# Patient Record
Sex: MALE | Race: WHITE | NOT HISPANIC OR LATINO | Employment: FULL TIME | URBAN - METROPOLITAN AREA
[De-identification: names, ages, dates, MRNs, and addresses within clinical notes are randomized per-mention and may not be internally consistent; named-entity substitution may affect disease eponyms.]

---

## 2021-05-10 ENCOUNTER — PATIENT MESSAGE (OUTPATIENT)
Dept: RESEARCH | Facility: HOSPITAL | Age: 56
End: 2021-05-10

## 2021-07-23 PROBLEM — U07.1 PNEUMONIA DUE TO COVID-19 VIRUS: Status: ACTIVE | Noted: 2021-07-23

## 2021-07-23 PROBLEM — I10 ESSENTIAL HYPERTENSION: Status: ACTIVE | Noted: 2021-07-23

## 2021-07-23 PROBLEM — J96.01 ACUTE HYPOXEMIC RESPIRATORY FAILURE: Status: ACTIVE | Noted: 2021-07-23

## 2021-07-23 PROBLEM — J12.82 PNEUMONIA DUE TO COVID-19 VIRUS: Status: ACTIVE | Noted: 2021-07-23

## 2021-07-27 PROBLEM — D68.9 COAGULATION DEFECT: Status: ACTIVE | Noted: 2021-07-27

## 2021-10-25 ENCOUNTER — CLINICAL SUPPORT (OUTPATIENT)
Dept: URGENT CARE | Facility: CLINIC | Age: 56
End: 2021-10-25
Payer: COMMERCIAL

## 2021-10-25 DIAGNOSIS — Z23 NEED FOR VACCINATION: Primary | ICD-10-CM

## 2021-10-25 PROCEDURE — 91303 COVID-19,VECTOR-NR,RS-AD26,PF,0.5 ML DOSE VACCINE (JANSSEN): CPT | Mod: S$GLB,,, | Performed by: FAMILY MEDICINE

## 2021-10-25 PROCEDURE — 0031A COVID-19,VECTOR-NR,RS-AD26,PF,0.5 ML DOSE VACCINE (JANSSEN): CPT | Mod: S$GLB,,, | Performed by: FAMILY MEDICINE

## 2021-10-25 PROCEDURE — 91303 COVID-19,VECTOR-NR,RS-AD26,PF,0.5 ML DOSE VACCINE (JANSSEN): ICD-10-PCS | Mod: S$GLB,,, | Performed by: FAMILY MEDICINE

## 2021-10-25 PROCEDURE — 0031A COVID-19,VECTOR-NR,RS-AD26,PF,0.5 ML DOSE VACCINE (JANSSEN): ICD-10-PCS | Mod: S$GLB,,, | Performed by: FAMILY MEDICINE

## 2022-01-10 ENCOUNTER — OFFICE VISIT (OUTPATIENT)
Dept: CARDIOLOGY | Facility: CLINIC | Age: 57
End: 2022-01-10
Payer: COMMERCIAL

## 2022-01-10 VITALS
SYSTOLIC BLOOD PRESSURE: 128 MMHG | WEIGHT: 315 LBS | BODY MASS INDEX: 39.17 KG/M2 | HEART RATE: 79 BPM | HEIGHT: 75 IN | DIASTOLIC BLOOD PRESSURE: 89 MMHG

## 2022-01-10 DIAGNOSIS — R06.02 SHORTNESS OF BREATH: ICD-10-CM

## 2022-01-10 DIAGNOSIS — R00.0 TACHYCARDIA: ICD-10-CM

## 2022-01-10 DIAGNOSIS — I83.93 VARICOSE VEINS OF BOTH LOWER EXTREMITIES, UNSPECIFIED WHETHER COMPLICATED: Primary | ICD-10-CM

## 2022-01-10 DIAGNOSIS — I10 ESSENTIAL HYPERTENSION: Primary | ICD-10-CM

## 2022-01-10 DIAGNOSIS — I10 ESSENTIAL HYPERTENSION: ICD-10-CM

## 2022-01-10 PROCEDURE — 3008F PR BODY MASS INDEX (BMI) DOCUMENTED: ICD-10-PCS | Mod: CPTII,S$GLB,, | Performed by: INTERNAL MEDICINE

## 2022-01-10 PROCEDURE — 99204 OFFICE O/P NEW MOD 45 MIN: CPT | Mod: S$GLB,,, | Performed by: INTERNAL MEDICINE

## 2022-01-10 PROCEDURE — 1159F PR MEDICATION LIST DOCUMENTED IN MEDICAL RECORD: ICD-10-PCS | Mod: CPTII,S$GLB,, | Performed by: INTERNAL MEDICINE

## 2022-01-10 PROCEDURE — 3079F DIAST BP 80-89 MM HG: CPT | Mod: CPTII,S$GLB,, | Performed by: INTERNAL MEDICINE

## 2022-01-10 PROCEDURE — 3008F BODY MASS INDEX DOCD: CPT | Mod: CPTII,S$GLB,, | Performed by: INTERNAL MEDICINE

## 2022-01-10 PROCEDURE — 1160F RVW MEDS BY RX/DR IN RCRD: CPT | Mod: CPTII,S$GLB,, | Performed by: INTERNAL MEDICINE

## 2022-01-10 PROCEDURE — 1159F MED LIST DOCD IN RCRD: CPT | Mod: CPTII,S$GLB,, | Performed by: INTERNAL MEDICINE

## 2022-01-10 PROCEDURE — 99999 PR PBB SHADOW E&M-EST. PATIENT-LVL III: ICD-10-PCS | Mod: PBBFAC,,, | Performed by: INTERNAL MEDICINE

## 2022-01-10 PROCEDURE — 93005 ELECTROCARDIOGRAM TRACING: CPT | Mod: PO

## 2022-01-10 PROCEDURE — 3074F SYST BP LT 130 MM HG: CPT | Mod: CPTII,S$GLB,, | Performed by: INTERNAL MEDICINE

## 2022-01-10 PROCEDURE — 99204 PR OFFICE/OUTPT VISIT, NEW, LEVL IV, 45-59 MIN: ICD-10-PCS | Mod: S$GLB,,, | Performed by: INTERNAL MEDICINE

## 2022-01-10 PROCEDURE — 93010 EKG 12-LEAD: ICD-10-PCS | Mod: S$GLB,,, | Performed by: INTERNAL MEDICINE

## 2022-01-10 PROCEDURE — 3079F PR MOST RECENT DIASTOLIC BLOOD PRESSURE 80-89 MM HG: ICD-10-PCS | Mod: CPTII,S$GLB,, | Performed by: INTERNAL MEDICINE

## 2022-01-10 PROCEDURE — 1160F PR REVIEW ALL MEDS BY PRESCRIBER/CLIN PHARMACIST DOCUMENTED: ICD-10-PCS | Mod: CPTII,S$GLB,, | Performed by: INTERNAL MEDICINE

## 2022-01-10 PROCEDURE — 93010 ELECTROCARDIOGRAM REPORT: CPT | Mod: S$GLB,,, | Performed by: INTERNAL MEDICINE

## 2022-01-10 PROCEDURE — 3074F PR MOST RECENT SYSTOLIC BLOOD PRESSURE < 130 MM HG: ICD-10-PCS | Mod: CPTII,S$GLB,, | Performed by: INTERNAL MEDICINE

## 2022-01-10 PROCEDURE — 99999 PR PBB SHADOW E&M-EST. PATIENT-LVL III: CPT | Mod: PBBFAC,,, | Performed by: INTERNAL MEDICINE

## 2022-01-10 RX ORDER — NAPROXEN SODIUM 220 MG/1
81 TABLET, FILM COATED ORAL DAILY
COMMUNITY
End: 2022-08-12

## 2022-01-10 RX ORDER — LISINOPRIL 10 MG/1
10 TABLET ORAL DAILY
Qty: 90 TABLET | Refills: 3 | Status: SHIPPED | OUTPATIENT
Start: 2022-01-10 | End: 2023-03-25 | Stop reason: SDUPTHER

## 2022-01-10 NOTE — PROGRESS NOTES
Subjective:    Patient ID:  Sreedhar Garrett is a 56 y.o. male who presents for evaluation of No chief complaint on file.      Problem List Items Addressed This Visit        Cardiac/Vascular    BLE Varicose Veins With H/O RLE Venous Stripping - Primary    Essential hypertension      Other Visit Diagnoses     Shortness of breath        Tachycardia              HPI  Presents today for evaluation.    The patient states that since he had COVID, had some issues with tachycardia. Has improved now significantly. HR at home is in the 50s to 70s. Pulse Ox OK at home as well.    No chest pain.  Still lingering shortness of breath.  Feels OK    2 cups of coffee a day    Cramped a lot from HCTZ, not taking    Personal history of heart attack or stroke - None that he is aware of  Family history of heart disease - Dad with multiple heart attacks in the past, started in late 50s to 60s    Past Medical History:   Diagnosis Date    ADHD (attention deficit hyperactivity disorder)     e Mildly Elevated TSH Level With Normal FT4 17     Will Monitor    f Morbid Obesity     2/15/17 RXd Lifestyle Changes; He NEVER WANTS Bariatric SX; 17 Cortisol Level = Normal    Hypertension     i Bilateral Viral Pneumonia 2021    [facility] 21-21 Stay For This    i COVID-19 Infection 2021    [facility] 21-21 Stay For This    j Family H/O Pancreatic Cancer     His Mother Survived This And  Many Years Later With Dementia    j Hemorrhoids With H/O Hemorrhoidectomy     j Mildly Elevated ALT Level 17     Will Monitor    k Decreased Libido     17 Testosterone = 183.0 (132.0-813.0)    k Family H/O Prostate Cancer     l H/O Right Knee Arthroscopic SX In      m Family H/O Dementia     His  Mother    o Allergic Rhinosinusitis     q BLE Varicose Veins With H/O RLE Venous Stripping     With Dr. Julian At [facility] In     Wellness Visit 2/15/2017        No past surgical history on  "file.    Family History   Problem Relation Age of Onset    Cancer Mother     Heart disease Father        Social History     Socioeconomic History    Marital status:    Tobacco Use    Smoking status: Never Smoker    Smokeless tobacco: Never Used   Substance and Sexual Activity    Alcohol use: Yes     Alcohol/week: 6.0 standard drinks     Types: 6 Cans of beer per week     Comment: 2-3 drinks / 2-3x week    Drug use: Never    Sexual activity: Yes       Review of patient's allergies indicates:  No Known Allergies    Review of Systems   Constitutional: Negative for decreased appetite, fever and malaise/fatigue.   Eyes: Negative for blurred vision.   Cardiovascular: Negative for chest pain, dyspnea on exertion, irregular heartbeat and leg swelling.   Respiratory: Negative for cough, hemoptysis, shortness of breath and wheezing.    Endocrine: Negative for cold intolerance and heat intolerance.   Hematologic/Lymphatic: Negative for bleeding problem.   Musculoskeletal: Negative for muscle weakness and myalgias.   Gastrointestinal: Negative for abdominal pain, constipation and diarrhea.   Genitourinary: Negative for bladder incontinence.   Neurological: Negative for dizziness and weakness.   Psychiatric/Behavioral: Negative for depression.        Objective:     Vitals:    01/10/22 1302   BP: 128/89   BP Location: Right arm   Patient Position: Sitting   BP Method: Large (Automatic)   Pulse: 79   Weight: (!) 161.9 kg (356 lb 14.8 oz)   Height: 6' 3" (1.905 m)        Physical Exam  Constitutional:       Appearance: He is well-developed and well-nourished.   HENT:      Head: Normocephalic and atraumatic.   Neck:      Vascular: No JVD.   Cardiovascular:      Rate and Rhythm: Normal rate and regular rhythm.      Heart sounds: Normal heart sounds. No murmur heard.  No friction rub. No gallop.    Pulmonary:      Effort: Pulmonary effort is normal. No respiratory distress.      Breath sounds: Normal breath sounds. No " wheezing or rales.   Abdominal:      General: Bowel sounds are normal.      Palpations: Abdomen is soft.      Tenderness: There is no abdominal tenderness. There is no guarding or rebound.   Musculoskeletal:         General: No edema.      Cervical back: Normal range of motion and neck supple.   Skin:     General: Skin is warm and dry.   Neurological:      Mental Status: He is alert and oriented to person, place, and time.   Psychiatric:         Behavior: Behavior normal.             Current Outpatient Medications on File Prior to Visit   Medication Sig    ascorbic acid-multivit-min (EMERGEN-C) 1,000 mg PwEP     aspirin 81 MG Chew Take 81 mg by mouth once daily.    dextroamphetamine-amphetamine 30 mg Tab Take 1 tablet by mouth 2 (two) times daily.    lisinopriL 10 MG tablet Take 1 mg by mouth once daily.    meloxicam (MOBIC) 7.5 MG tablet Take 1 tablet (7.5 mg total) by mouth once daily.    multivitamin capsule Take 1 capsule by mouth once daily.    vitamin D (VITAMIN D3) 1000 units Tab Take 1,000 Units by mouth once daily. Take 2    traZODone (DESYREL) 50 MG tablet Take 0.5 tablets (25 mg total) by mouth nightly as needed for Insomnia.    [DISCONTINUED] hydroCHLOROthiazide (HYDRODIURIL) 12.5 MG Tab Take 1 tablet (12.5 mg total) by mouth once daily.     No current facility-administered medications on file prior to visit.       Lipid Panel:   Lab Results   Component Value Date    CHOL 172 04/26/2017    HDL 43 04/26/2017    LDLCALC 115.0 04/26/2017    TRIG 70 04/26/2017    CHOLHDL 25.0 04/26/2017         The ASCVD Risk score (Katrina OSMIN Jr., et al., 2013) failed to calculate for the following reasons:    Cannot find a previous HDL lab    Cannot find a previous total cholesterol lab    All pertinent labs, imaging, and EKGs reviewed.  Patient's most recent EKG tracing was personally interpreted by this provider.    Assessment:       1. Varicose veins of both lower extremities, unspecified whether complicated    2.  Essential hypertension    3. Shortness of breath    4. Tachycardia         Plan:     Symptoms of lingering shortness of breath possibly from COVID  BP/Pulse okay today  Most recent echocardiogram reviewed personally     Echocardiogram   Lipid panel was done with blood donation, will send us the numbers  Omega 3s    Continue other cardiac medications  Mediterranean Diet/Cardiovascular Exercise Program    Patient queried and all questions were answered.    F/u in 1 year to reassess      Signed:    Stephen Sin MD  1/10/2022 12:11 PM

## 2022-01-25 DIAGNOSIS — E78.2 MIXED HYPERLIPIDEMIA: Primary | ICD-10-CM

## 2022-08-29 PROBLEM — D68.9 COAGULATION DEFECT: Status: RESOLVED | Noted: 2021-07-27 | Resolved: 2022-08-29

## 2022-08-29 PROBLEM — I10 ESSENTIAL HYPERTENSION: Status: RESOLVED | Noted: 2021-07-23 | Resolved: 2022-08-29

## 2022-08-29 PROBLEM — Z86.16 HISTORY OF 2019 NOVEL CORONAVIRUS DISEASE (COVID-19): Status: ACTIVE | Noted: 2022-08-29

## 2022-08-29 PROBLEM — F90.9 ADHD (ATTENTION DEFICIT HYPERACTIVITY DISORDER): Status: ACTIVE | Noted: 2022-08-29

## 2022-08-29 PROBLEM — I10 ESSENTIAL (PRIMARY) HYPERTENSION: Status: ACTIVE | Noted: 2022-08-29

## 2022-08-29 PROBLEM — Z87.01 HISTORY OF VIRAL PNEUMONIA: Status: ACTIVE | Noted: 2022-08-29

## 2022-11-25 PROBLEM — Z87.01 HISTORY OF VIRAL PNEUMONIA: Status: RESOLVED | Noted: 2022-08-29 | Resolved: 2022-11-25

## 2022-11-25 PROBLEM — J96.01 ACUTE HYPOXEMIC RESPIRATORY FAILURE: Status: RESOLVED | Noted: 2021-07-23 | Resolved: 2022-11-25

## 2022-11-25 PROBLEM — J12.82 PNEUMONIA DUE TO COVID-19 VIRUS: Status: RESOLVED | Noted: 2021-07-23 | Resolved: 2022-11-25

## 2022-11-25 PROBLEM — U07.1 PNEUMONIA DUE TO COVID-19 VIRUS: Status: RESOLVED | Noted: 2021-07-23 | Resolved: 2022-11-25

## 2023-02-18 PROBLEM — Z86.16 HISTORY OF 2019 NOVEL CORONAVIRUS DISEASE (COVID-19): Status: RESOLVED | Noted: 2022-08-29 | Resolved: 2023-02-18

## 2023-02-18 PROBLEM — J30.9 ALLERGIC SINUSITIS: Status: ACTIVE | Noted: 2023-02-18

## 2023-02-18 PROBLEM — Z87.01 HISTORY OF VIRAL PNEUMONIA: Status: ACTIVE | Noted: 2023-02-18

## 2023-02-18 PROBLEM — Z86.16 HISTORY OF 2019 NOVEL CORONAVIRUS DISEASE (COVID-19): Status: RESOLVED | Noted: 2023-02-18 | Resolved: 2023-02-18

## 2023-02-18 PROBLEM — Z86.16 HISTORY OF 2019 NOVEL CORONAVIRUS DISEASE (COVID-19): Status: ACTIVE | Noted: 2023-02-18

## 2023-03-25 RX ORDER — LISINOPRIL 10 MG/1
10 TABLET ORAL DAILY
Qty: 90 TABLET | Refills: 3 | Status: SHIPPED | OUTPATIENT
Start: 2023-03-25 | End: 2023-03-29 | Stop reason: SDUPTHER

## 2023-03-29 DIAGNOSIS — Z81.8 FAMILY HISTORY OF DEMENTIA: ICD-10-CM

## 2023-03-29 DIAGNOSIS — I10 ESSENTIAL (PRIMARY) HYPERTENSION: Primary | ICD-10-CM

## 2023-03-29 RX ORDER — LISINOPRIL 10 MG/1
10 TABLET ORAL DAILY
Qty: 90 TABLET | Refills: 0 | Status: SHIPPED | OUTPATIENT
Start: 2023-03-29 | End: 2024-03-28

## 2023-09-21 PROBLEM — G47.9 SLEEP DISTURBANCES: Status: ACTIVE | Noted: 2023-09-21

## 2023-09-21 PROBLEM — R45.4 IRRITABILITY AND ANGER: Status: ACTIVE | Noted: 2023-09-21

## 2024-04-07 DIAGNOSIS — I10 ESSENTIAL (PRIMARY) HYPERTENSION: ICD-10-CM

## 2024-04-07 DIAGNOSIS — Z81.8 FAMILY HISTORY OF DEMENTIA: ICD-10-CM

## 2024-04-07 RX ORDER — LISINOPRIL 10 MG/1
10 TABLET ORAL DAILY
Qty: 90 TABLET | Refills: 3 | Status: SHIPPED | OUTPATIENT
Start: 2024-04-07 | End: 2025-04-07

## 2025-06-10 PROBLEM — M19.012 ARTHRITIS OF LEFT ACROMIOCLAVICULAR JOINT: Status: ACTIVE | Noted: 2025-06-10

## 2025-06-10 PROBLEM — M75.122 NONTRAUMATIC COMPLETE TEAR OF LEFT ROTATOR CUFF: Status: ACTIVE | Noted: 2025-06-10

## 2025-06-10 PROBLEM — M75.22 TENDINITIS OF LONG HEAD OF BICEPS BRACHII OF LEFT SHOULDER: Status: ACTIVE | Noted: 2025-06-10

## 2025-06-10 PROBLEM — S46.812D PARTIAL TEAR OF SUBSCAPULARIS TENDON, LEFT, SUBSEQUENT ENCOUNTER: Status: ACTIVE | Noted: 2025-06-10

## 2025-06-13 ENCOUNTER — PATIENT MESSAGE (OUTPATIENT)
Dept: CARDIOLOGY | Facility: CLINIC | Age: 60
End: 2025-06-13

## 2025-06-30 ENCOUNTER — OFFICE VISIT (OUTPATIENT)
Dept: CARDIOLOGY | Facility: CLINIC | Age: 60
End: 2025-06-30
Payer: COMMERCIAL

## 2025-06-30 VITALS
HEART RATE: 78 BPM | DIASTOLIC BLOOD PRESSURE: 76 MMHG | SYSTOLIC BLOOD PRESSURE: 115 MMHG | WEIGHT: 292 LBS | BODY MASS INDEX: 38.7 KG/M2 | HEIGHT: 73 IN

## 2025-06-30 DIAGNOSIS — I83.93 VARICOSE VEINS OF BOTH LOWER EXTREMITIES, UNSPECIFIED WHETHER COMPLICATED: ICD-10-CM

## 2025-06-30 DIAGNOSIS — I10 ESSENTIAL (PRIMARY) HYPERTENSION: ICD-10-CM

## 2025-06-30 DIAGNOSIS — Z87.01 HISTORY OF VIRAL PNEUMONIA: ICD-10-CM

## 2025-06-30 DIAGNOSIS — Z01.818 PREOPERATIVE CLEARANCE: Primary | ICD-10-CM

## 2025-06-30 PROCEDURE — 3044F HG A1C LEVEL LT 7.0%: CPT | Mod: CPTII,S$GLB,,

## 2025-06-30 PROCEDURE — 99204 OFFICE O/P NEW MOD 45 MIN: CPT | Mod: S$GLB,,,

## 2025-06-30 PROCEDURE — 4010F ACE/ARB THERAPY RXD/TAKEN: CPT | Mod: CPTII,S$GLB,,

## 2025-06-30 PROCEDURE — 3074F SYST BP LT 130 MM HG: CPT | Mod: CPTII,S$GLB,,

## 2025-06-30 PROCEDURE — 3008F BODY MASS INDEX DOCD: CPT | Mod: CPTII,S$GLB,,

## 2025-06-30 PROCEDURE — 1159F MED LIST DOCD IN RCRD: CPT | Mod: CPTII,S$GLB,,

## 2025-06-30 PROCEDURE — 3078F DIAST BP <80 MM HG: CPT | Mod: CPTII,S$GLB,,

## 2025-06-30 PROCEDURE — 99999 PR PBB SHADOW E&M-EST. PATIENT-LVL III: CPT | Mod: PBBFAC,,,

## 2025-06-30 NOTE — PROGRESS NOTES
Subjective & HPI:    Patient ID:  Sreedhar Garrett is a 60 y.o. male patient here for evaluation No chief complaint on file.    Previously seen by Dr. Sin in 2022 here today for a preoperative clearance for a shoulder surgery, injured while weight lifting. Works as an ICU nurse. Having no CV concerns. BP well controlled.      Past Medical History:   Diagnosis Date    b Hypertension     e Mildly Elevated TSH Level With Normal FT4 17     Will Monitor    f Morbid Obesity     2/15/17 RXd Lifestyle Changes; He NEVER WANTS Bariatric SX; 17 Cortisol Level = Normal    i H/O COVID-19 Infection With Pneumonia     [facility] 21-21 Stay For This    j Family H/O Pancreatic Cancer     His Mother Survived This And  Many Years Later With Dementia    j Hemorrhoids With H/O Hemorrhoidectomy     j Mildly Elevated ALT Level 17     Will Monitor    k Decreased Libido     17 Testosterone = 183.0 (132.0-813.0)    k Family H/O Prostate Cancer     l H/O Right Knee Arthroscopic SX In      m Family H/O Dementia     His  Mother    n Attention Deficit hyperactivity Disorder     o Allergic Rhinosinusitis     q BLE Varicose Veins With H/O RLE Venous Stripping     With [facility]. [facility] In     Wellness Visit 2/15/2017      Past Surgical History:   Procedure Laterality Date    HEMORRHOID SURGERY          KNEE ARTHROSCOPY W/ MENISCAL REPAIR Bilateral      Social History[1]    Most Recent Echocardiogram Results  Results for orders placed during the hospital encounter of 22    Echo    Interpretation Summary  · The left ventricle is normal in size with concentric remodeling and normal systolic function.  · The estimated ejection fraction is 65%.  · Normal left ventricular diastolic function.  · Normal right ventricular size with normal right ventricular systolic function.  · Normal central venous pressure (3 mmHg).  · The estimated PA systolic pressure is 13 mmHg.      Most Recent  Nuclear Stress Test Results  No results found for this or any previous visit.      Most Recent Cardiac PET Stress Test Results  No results found for this or any previous visit.      Most Recent Cardiovascular Angiogram results  No results found for this or any previous visit.      Other Most Recent Cardiology Results  Results for orders placed during the hospital encounter of 12/14/24    Cardiac monitoring strips      REVIEW OF SYSTEMS: As noted in HPI   CARDIOVASCULAR: No recent chest pain, palpitations, arm/neck/jaw pain, or edema.  RESPIRATORY: No recent fever, cough, SOB.  : No blood in the urine  GI: No reflux, nausea, vomiting, or blood in stool.   MUSCULOSKELETAL: No falls.   NEURO: No headaches, syncope, or dizziness.  EYES: No sudden changes in vision.        Objective      Vitals:    06/30/25 0818   BP: 115/76   Pulse: 78       LAST EKG  Results for orders placed or performed during the hospital encounter of 06/25/25   EKG 12-lead    Collection Time: 06/25/25 10:27 AM   Result Value Ref Range    QRS Duration 92 ms    [facility] QTC Calculation 444 ms    Narrative    Test Reason : Z01.818,    Vent. Rate :  68 BPM     Atrial Rate :  68 BPM     P-R Int : 154 ms          QRS Dur :  92 ms      QT Int : 418 ms       P-R-T Axes :  64  46  72 degrees    QTcB Int : 444 ms    Sinus rhythm with occasional Premature ventricular complexes  Possible Left atrial enlargement  Borderline Abnormal ECG  Confirmed by Valentin Tijerina ([id number]) on 6/25/2025 12:15:30 PM    Referred By: BASIM JACKSON           Confirmed By: Valentin Tijerina     LIPIDS - LAST 2   Lab Results   Component Value Date    CHOL 163 06/21/2024    CHOL 183 01/26/2022    HDL 57 06/21/2024    HDL 58 01/26/2022    LDLCALC 98.2 06/21/2024    LDLCALC 115.8 01/26/2022    TRIG 39 06/21/2024    TRIG 46 01/26/2022    CHOLHDL 35.0 06/21/2024    CHOLHDL 31.7 01/26/2022     CARDIAC PROFILE - LAST 2  Lab Results   Component Value Date    CPK 90 12/14/2024     (H) 07/23/2021     TROPONINI <0.012 07/23/2021      CBC - LAST 2  Lab Results   Component Value Date    WBC 4.26 06/25/2025    WBC 4.47 12/14/2024    HGB 13.8 (L) 06/25/2025    HGB 12.7 (L) 12/14/2024    HCT 40.0 06/25/2025    HCT 37.4 (L) 12/14/2024     06/25/2025     12/14/2024     Lab Results   Component Value Date    LABPT 13.3 06/25/2025    LABPT 12.5 07/23/2021    INR 1.0 06/25/2025    INR 1.0 07/23/2021    APTT 27.5 06/25/2025    APTT 27.2 07/23/2021     CHEMISTRY - LAST 2  Lab Results   Component Value Date     06/25/2025     12/14/2024    K 4.5 06/25/2025    K 4.3 12/14/2024    CO2 24 06/25/2025    CO2 22 (L) 12/14/2024    BUN 20 06/25/2025    BUN 17 12/14/2024    CREATININE 0.92 06/25/2025    CREATININE 0.87 12/14/2024    GLU 91 06/25/2025     12/14/2024    CALCIUM 9.3 06/25/2025    CALCIUM 9.3 12/14/2024    MG 1.9 12/14/2024    MG 2.3 07/28/2021    ALBUMIN 4.1 06/25/2025    ALBUMIN 4.0 12/14/2024    ALT 18 06/25/2025    ALT 18 12/14/2024    AST 21 06/25/2025    AST 19 12/14/2024      ENDOCRINE - LAST 2  Lab Results   Component Value Date    HGBA1C 4.9 06/25/2025    HGBA1C 5.2 06/21/2024    TSH 1.580 06/21/2024    TSH 4.020 (H) 04/26/2017        PHYSICAL EXAM  CONSTITUTIONAL: Well built, well nourished in no apparent distress  NECK: no carotid bruit, no JVD  LUNGS: CTA  CHEST WALL: no tenderness  HEART: regular rate and rhythm, S1, S2 normal, no murmur, click, rub or gallop   ABDOMEN: soft, non-tender; bowel sounds normal; no masses,  no organomegaly  EXTREMITIES: Varicose veins noted. Extremities normal, no edema, no calf tenderness noted  NEURO: AAO X 3    I HAVE REVIEWED :    The vital signs, most recent cardiac testing, and most recent pertinent non-cardiology provider notes.    Current Outpatient Medications   Medication Instructions    ascorbic acid-multivit-min (EMERGEN-C) 1,000 mg PwEP No dose, route, or frequency recorded.    dextroamphetamine-amphetamine 30 mg Tab 1 tablet, 2 times  daily    docusate sodium (COLACE) 100 mg, Oral, 2 times daily    doxycycline (VIBRAMYCIN) 100 mg, Oral, 2 times daily    fluticasone propionate (FLONASE) 100 mcg, Each Nostril, Daily PRN    ibuprofen (ADVIL,MOTRIN) 400 mg, Oral, Every 6 hours PRN    lisinopriL 10 mg, Oral, Daily    meloxicam (MOBIC) 15 mg, Oral, Daily    multivitamin capsule 1 capsule, Daily    ondansetron (ZOFRAN-ODT) 4 mg, Oral, 2 times daily    oxyCODONE-acetaminophen (PERCOCET)  mg per tablet 1 tablet, Oral, Every 4 hours PRN    vitamin D (VITAMIN D3) 1,000 Units, Daily    zinc gluconate 50 mg, Daily        Assessment & Plan   1. Preoperative clearance (Primary)  >4 METS without any angina   Recent EKG without any ischemic change   Echo 2022 structurally normal heart   Has tolerated anesthesia in past well   Clear for surgery at acceptable CV risks     2. Essential (primary) hypertension  BP well controlled   Continue lisinopril 10 mg daily     3. Varicose veins of both lower extremities, unspecified whether complicated  Noted   Continue conservative management     4. H/O COVID-19 Infection With Pneumonia  Post Covid symptoms resolved          Would like to re-establish care with Dr. Carlos, to see him in clinic in one year.     Gerri Morillo PA-C   [facility]   Office: [phone]          [1]   Social History  Tobacco Use    Smoking status: Never     Passive exposure: Never    Smokeless tobacco: Never   Substance Use Topics    Alcohol use: Yes     Alcohol/week: 6.0 standard drinks of alcohol     Types: 6 Cans of beer per week     Comment: 2-3 drinks / 2-3x week    Drug use: Never